# Patient Record
Sex: MALE | Race: WHITE | Employment: FULL TIME | URBAN - METROPOLITAN AREA
[De-identification: names, ages, dates, MRNs, and addresses within clinical notes are randomized per-mention and may not be internally consistent; named-entity substitution may affect disease eponyms.]

---

## 2020-01-09 ENCOUNTER — APPOINTMENT (OUTPATIENT)
Dept: GENERAL RADIOLOGY | Age: 70
DRG: 309 | End: 2020-01-09
Attending: EMERGENCY MEDICINE
Payer: MEDICARE

## 2020-01-09 ENCOUNTER — HOSPITAL ENCOUNTER (INPATIENT)
Age: 70
LOS: 1 days | Discharge: HOME | DRG: 309 | End: 2020-01-10
Attending: EMERGENCY MEDICINE | Admitting: INTERNAL MEDICINE
Payer: MEDICARE

## 2020-01-09 DIAGNOSIS — I48.91 ATRIAL FIBRILLATION, UNSPECIFIED TYPE (HCC): ICD-10-CM

## 2020-01-09 DIAGNOSIS — I48.91 ATRIAL FIBRILLATION WITH RAPID VENTRICULAR RESPONSE (HCC): Primary | ICD-10-CM

## 2020-01-09 DIAGNOSIS — R93.89 ABNORMAL CHEST X-RAY: ICD-10-CM

## 2020-01-09 LAB
ALBUMIN SERPL-MCNC: 4.1 G/DL (ref 3.5–5)
ALBUMIN/GLOB SERPL: 1.2 {RATIO} (ref 1.1–2.2)
ALP SERPL-CCNC: 53 U/L (ref 45–117)
ALT SERPL-CCNC: 73 U/L (ref 12–78)
ANION GAP SERPL CALC-SCNC: 5 MMOL/L (ref 5–15)
AST SERPL-CCNC: 37 U/L (ref 15–37)
ATRIAL RATE: 308 BPM
BASOPHILS # BLD: 0.1 K/UL (ref 0–0.1)
BASOPHILS NFR BLD: 1 % (ref 0–1)
BILIRUB SERPL-MCNC: 0.7 MG/DL (ref 0.2–1)
BUN SERPL-MCNC: 13 MG/DL (ref 6–20)
BUN/CREAT SERPL: 14 (ref 12–20)
CALCIUM SERPL-MCNC: 8.8 MG/DL (ref 8.5–10.1)
CALCULATED R AXIS, ECG10: -10 DEGREES
CALCULATED T AXIS, ECG11: 85 DEGREES
CHLORIDE SERPL-SCNC: 109 MMOL/L (ref 97–108)
CK SERPL-CCNC: 190 U/L (ref 39–308)
CO2 SERPL-SCNC: 27 MMOL/L (ref 21–32)
COMMENT, HOLDF: NORMAL
CREAT SERPL-MCNC: 0.93 MG/DL (ref 0.7–1.3)
DIAGNOSIS, 93000: NORMAL
DIFFERENTIAL METHOD BLD: NORMAL
EOSINOPHIL # BLD: 0.2 K/UL (ref 0–0.4)
EOSINOPHIL NFR BLD: 3 % (ref 0–7)
ERYTHROCYTE [DISTWIDTH] IN BLOOD BY AUTOMATED COUNT: 13.2 % (ref 11.5–14.5)
GLOBULIN SER CALC-MCNC: 3.3 G/DL (ref 2–4)
GLUCOSE SERPL-MCNC: 103 MG/DL (ref 65–100)
HCT VFR BLD AUTO: 44.8 % (ref 36.6–50.3)
HGB BLD-MCNC: 15.1 G/DL (ref 12.1–17)
IMM GRANULOCYTES # BLD AUTO: 0 K/UL (ref 0–0.04)
IMM GRANULOCYTES NFR BLD AUTO: 0 % (ref 0–0.5)
LYMPHOCYTES # BLD: 1.3 K/UL (ref 0.8–3.5)
LYMPHOCYTES NFR BLD: 19 % (ref 12–49)
MCH RBC QN AUTO: 30.3 PG (ref 26–34)
MCHC RBC AUTO-ENTMCNC: 33.7 G/DL (ref 30–36.5)
MCV RBC AUTO: 90 FL (ref 80–99)
MONOCYTES # BLD: 0.6 K/UL (ref 0–1)
MONOCYTES NFR BLD: 8 % (ref 5–13)
NEUTS SEG # BLD: 4.7 K/UL (ref 1.8–8)
NEUTS SEG NFR BLD: 69 % (ref 32–75)
NRBC # BLD: 0 K/UL (ref 0–0.01)
NRBC BLD-RTO: 0 PER 100 WBC
PLATELET # BLD AUTO: 212 K/UL (ref 150–400)
PMV BLD AUTO: 11.1 FL (ref 8.9–12.9)
POTASSIUM SERPL-SCNC: 3.9 MMOL/L (ref 3.5–5.1)
PROT SERPL-MCNC: 7.4 G/DL (ref 6.4–8.2)
Q-T INTERVAL, ECG07: 342 MS
QRS DURATION, ECG06: 120 MS
QTC CALCULATION (BEZET), ECG08: 547 MS
RBC # BLD AUTO: 4.98 M/UL (ref 4.1–5.7)
SAMPLES BEING HELD,HOLD: NORMAL
SODIUM SERPL-SCNC: 141 MMOL/L (ref 136–145)
TROPONIN I BLD-MCNC: <0.04 NG/ML (ref 0–0.08)
TROPONIN I SERPL-MCNC: <0.05 NG/ML
TSH SERPL DL<=0.05 MIU/L-ACNC: 2.36 UIU/ML (ref 0.36–3.74)
VENTRICULAR RATE, ECG03: 154 BPM
WBC # BLD AUTO: 6.9 K/UL (ref 4.1–11.1)

## 2020-01-09 PROCEDURE — 82550 ASSAY OF CK (CPK): CPT

## 2020-01-09 PROCEDURE — 71045 X-RAY EXAM CHEST 1 VIEW: CPT

## 2020-01-09 PROCEDURE — 96376 TX/PRO/DX INJ SAME DRUG ADON: CPT

## 2020-01-09 PROCEDURE — 74011250636 HC RX REV CODE- 250/636: Performed by: EMERGENCY MEDICINE

## 2020-01-09 PROCEDURE — 85025 COMPLETE CBC W/AUTO DIFF WBC: CPT

## 2020-01-09 PROCEDURE — 74011000250 HC RX REV CODE- 250: Performed by: INTERNAL MEDICINE

## 2020-01-09 PROCEDURE — 36415 COLL VENOUS BLD VENIPUNCTURE: CPT

## 2020-01-09 PROCEDURE — 84443 ASSAY THYROID STIM HORMONE: CPT

## 2020-01-09 PROCEDURE — 65660000000 HC RM CCU STEPDOWN

## 2020-01-09 PROCEDURE — 96374 THER/PROPH/DIAG INJ IV PUSH: CPT

## 2020-01-09 PROCEDURE — 93005 ELECTROCARDIOGRAM TRACING: CPT

## 2020-01-09 PROCEDURE — 99285 EMERGENCY DEPT VISIT HI MDM: CPT

## 2020-01-09 PROCEDURE — 84484 ASSAY OF TROPONIN QUANT: CPT

## 2020-01-09 PROCEDURE — 74011250636 HC RX REV CODE- 250/636: Performed by: INTERNAL MEDICINE

## 2020-01-09 PROCEDURE — 74011250637 HC RX REV CODE- 250/637: Performed by: INTERNAL MEDICINE

## 2020-01-09 PROCEDURE — 71046 X-RAY EXAM CHEST 2 VIEWS: CPT

## 2020-01-09 PROCEDURE — 80053 COMPREHEN METABOLIC PANEL: CPT

## 2020-01-09 PROCEDURE — 74011000250 HC RX REV CODE- 250: Performed by: EMERGENCY MEDICINE

## 2020-01-09 RX ORDER — LATANOPROST 50 UG/ML
1 SOLUTION/ DROPS OPHTHALMIC
COMMUNITY

## 2020-01-09 RX ORDER — ACETAMINOPHEN 325 MG/1
650 TABLET ORAL
Status: DISCONTINUED | OUTPATIENT
Start: 2020-01-09 | End: 2020-01-10 | Stop reason: HOSPADM

## 2020-01-09 RX ORDER — DILTIAZEM HYDROCHLORIDE 5 MG/ML
5 INJECTION INTRAVENOUS
Status: COMPLETED | OUTPATIENT
Start: 2020-01-09 | End: 2020-01-09

## 2020-01-09 RX ORDER — LATANOPROST 50 UG/ML
1 SOLUTION/ DROPS OPHTHALMIC
Status: DISCONTINUED | OUTPATIENT
Start: 2020-01-09 | End: 2020-01-10 | Stop reason: HOSPADM

## 2020-01-09 RX ORDER — SERTRALINE HYDROCHLORIDE 50 MG/1
50 TABLET, FILM COATED ORAL DAILY
Status: DISCONTINUED | OUTPATIENT
Start: 2020-01-10 | End: 2020-01-10 | Stop reason: HOSPADM

## 2020-01-09 RX ORDER — HEPARIN SODIUM 5000 [USP'U]/ML
5000 INJECTION, SOLUTION INTRAVENOUS; SUBCUTANEOUS EVERY 8 HOURS
Status: DISCONTINUED | OUTPATIENT
Start: 2020-01-09 | End: 2020-01-09

## 2020-01-09 RX ORDER — SODIUM CHLORIDE 0.9 % (FLUSH) 0.9 %
5-40 SYRINGE (ML) INJECTION AS NEEDED
Status: DISCONTINUED | OUTPATIENT
Start: 2020-01-09 | End: 2020-01-10 | Stop reason: HOSPADM

## 2020-01-09 RX ORDER — DILTIAZEM HYDROCHLORIDE 5 MG/ML
10 INJECTION INTRAVENOUS
Status: DISCONTINUED | OUTPATIENT
Start: 2020-01-09 | End: 2020-01-09

## 2020-01-09 RX ORDER — DILTIAZEM HYDROCHLORIDE 5 MG/ML
10 INJECTION INTRAVENOUS
Status: COMPLETED | OUTPATIENT
Start: 2020-01-09 | End: 2020-01-09

## 2020-01-09 RX ORDER — SODIUM CHLORIDE 0.9 % (FLUSH) 0.9 %
5-40 SYRINGE (ML) INJECTION EVERY 8 HOURS
Status: DISCONTINUED | OUTPATIENT
Start: 2020-01-09 | End: 2020-01-10 | Stop reason: HOSPADM

## 2020-01-09 RX ORDER — METOPROLOL TARTRATE 5 MG/5ML
2.5 INJECTION INTRAVENOUS
Status: DISCONTINUED | OUTPATIENT
Start: 2020-01-09 | End: 2020-01-10 | Stop reason: HOSPADM

## 2020-01-09 RX ORDER — ONDANSETRON 2 MG/ML
4 INJECTION INTRAMUSCULAR; INTRAVENOUS
Status: DISCONTINUED | OUTPATIENT
Start: 2020-01-09 | End: 2020-01-10 | Stop reason: HOSPADM

## 2020-01-09 RX ORDER — DOCUSATE SODIUM 100 MG/1
100 CAPSULE, LIQUID FILLED ORAL
Status: DISCONTINUED | OUTPATIENT
Start: 2020-01-09 | End: 2020-01-10 | Stop reason: HOSPADM

## 2020-01-09 RX ORDER — PANTOPRAZOLE SODIUM 40 MG/1
40 TABLET, DELAYED RELEASE ORAL DAILY
Status: DISCONTINUED | OUTPATIENT
Start: 2020-01-10 | End: 2020-01-10 | Stop reason: HOSPADM

## 2020-01-09 RX ADMIN — Medication 5 ML: at 16:26

## 2020-01-09 RX ADMIN — LATANOPROST 1 DROP: 50 SOLUTION OPHTHALMIC at 23:06

## 2020-01-09 RX ADMIN — Medication 10 ML: at 21:37

## 2020-01-09 RX ADMIN — ACETAMINOPHEN 650 MG: 325 TABLET ORAL at 21:37

## 2020-01-09 RX ADMIN — DILTIAZEM HYDROCHLORIDE 5 MG: 5 INJECTION INTRAVENOUS at 12:34

## 2020-01-09 RX ADMIN — SODIUM CHLORIDE 500 ML: 900 INJECTION, SOLUTION INTRAVENOUS at 13:28

## 2020-01-09 RX ADMIN — APIXABAN 5 MG: 5 TABLET, FILM COATED ORAL at 17:33

## 2020-01-09 RX ADMIN — DEXTROSE MONOHYDRATE 15 MG/HR: 50 INJECTION, SOLUTION INTRAVENOUS at 21:23

## 2020-01-09 RX ADMIN — DILTIAZEM HYDROCHLORIDE 10 MG: 5 INJECTION INTRAVENOUS at 13:30

## 2020-01-09 RX ADMIN — DEXTROSE MONOHYDRATE 5 MG/HR: 50 INJECTION, SOLUTION INTRAVENOUS at 16:07

## 2020-01-09 NOTE — PROGRESS NOTES
Pharmacy Clarification of the Prior to Admission Medication Regimen Retrospective to the Admission Medication Reconciliation    The patient was interviewed regarding clarification of the prior to admission medication regimen. The patient's wife was present in room and obtained permission from patient to discuss drug regimen with visitor(s) present. The patient was questioned regarding use of any other inhalers, topical products, over the counter medications, herbal medications, vitamin products or ophthalmic/nasal/otic medication use. Information Obtained From: Patient, Rx Query    Recommendations/Findings: The following amendments were made to the patient's active medication list on file at [facility]:     1) Additions:   latanoprost (XALATAN) 0.005 % ophthalmic solution      2) Removals:   Percocet      3) Changes:  sertraline (ZOLOFT) 50 mg tablet (Old regimen: Take PO daily /New regimen: Take 50 mg PO daily)    4) Pertinent Pharmacy Findings:  Updated patients preferred outpatient pharmacy to: [address] medication list was corrected to the following:     Prior to Admission Medications   Prescriptions Last Dose Informant Taking?   fexofenadine (ALLEGRA) 60 mg tablet 1/9/2020 at Unknown time Self Yes   Sig: Take 60 mg by mouth daily. Indications: ALLERGIC RHINITIS   latanoprost (XALATAN) 0.005 % ophthalmic solution 1/8/2020 at Unknown time Self Yes   Sig: Administer 1 Drop to right eye nightly. pantoprazole (PROTONIX) 40 mg tablet 1/8/2020 at Unknown time Self Yes   Sig: Take 40 mg by mouth daily. sertraline (ZOLOFT) 50 mg tablet 1/9/2020 at Unknown time Self Yes   Sig: Take 50 mg by mouth daily. Facility-Administered Medications: None          Thank you,  Radha Peacock  Pharm D.  Candidate Class of 2021

## 2020-01-09 NOTE — ED NOTES
Maxed out of Diltazem drip.  Per cardiologist keep drip at 15mg/hr if stable and they will see pt in AM

## 2020-01-09 NOTE — ED PROVIDER NOTES
EMERGENCY DEPARTMENT HISTORY AND PHYSICAL EXAM      Date: 1/9/2020  Patient Name: Marciano Cranker    History of Presenting Illness     Chief Complaint   Patient presents with    Irregular Heart Beat     sent over by Cameron jennings for abnormal ekg       History Provided By: Patient    HPI: Marciano Cranker, 78 y.o. male presents to the ED with cc of irregular heartbeat. The patient states that he was at a routine clinic visit today to obtain a refill of 1 of his medications. He states that they checked his pulse multiple times and his doctor ordered an EKG. He was sent in because of atrial fib/flutter. He cannot tell that his heart is beating rapidly. He states that occasionally he feels short of breath and he had some fleeting chest discomfort when he arrived in the ED today. He denies fever, chills, leg pain or leg edema. He has no known history of prior irregular heartbeat. He states that he had a stress test many years ago, but cannot remember which cardiology group performed it. He denies dizziness or lightheadedness. There are no other complaints, changes, or physical findings at this time. PCP: Mel Phelps MD    No current facility-administered medications on file prior to encounter. Current Outpatient Medications on File Prior to Encounter   Medication Sig Dispense Refill    sertraline (ZOLOFT) 50 mg tablet Take  by mouth daily.  pantoprazole (PROTONIX) 40 mg tablet Take 40 mg by mouth daily.  oxyCODONE-acetaminophen (PERCOCET) 5-325 mg per tablet Take 2 Tabs by mouth every four (4) hours as needed for Pain. Max Daily Amount: 12 Tabs. 20 Tab 0    fexofenadine (ALLEGRA) 60 mg tablet Take 60 mg by mouth daily.  Indications: ALLERGIC RHINITIS         Past History     Past Medical History:  Past Medical History:   Diagnosis Date    GERD (gastroesophageal reflux disease)     Psychiatric disorder     anxiety and panic attacks       Past Surgical History:  Past Surgical History:   Procedure Laterality Date    HX HEENT      septum repair       Family History:  No family history on file. Social History:  Social History     Tobacco Use    Smoking status: Never Smoker   Substance Use Topics    Alcohol use: No    Drug use: No       Allergies: Allergies   Allergen Reactions    Pcn [Penicillins] Rash         Review of Systems   Review of Systems   Constitutional: Negative for chills and fever. HENT: Negative for congestion. Eyes: Negative. Respiratory: Positive for shortness of breath. Cardiovascular: Positive for chest pain. Gastrointestinal: Negative for abdominal pain. Endocrine: Negative for heat intolerance. Genitourinary: Negative for dysuria. Musculoskeletal: Negative for back pain. Skin: Negative for rash. Allergic/Immunologic: Negative for immunocompromised state. Neurological: Negative for dizziness and light-headedness. Hematological: Does not bruise/bleed easily. Psychiatric/Behavioral: Negative. All other systems reviewed and are negative. Physical Exam   Physical Exam  Vitals signs and nursing note reviewed. Constitutional:       General: He is not in acute distress. Appearance: He is well-developed. HENT:      Head: Normocephalic and atraumatic. Eyes:      Extraocular Movements: Extraocular movements intact. Pupils: Pupils are equal, round, and reactive to light. Neck:      Musculoskeletal: Normal range of motion. Cardiovascular:      Rate and Rhythm: Tachycardia present. Rhythm irregular. Pulses: Normal pulses. Heart sounds: Normal heart sounds. Pulmonary:      Effort: Pulmonary effort is normal.      Breath sounds: Normal breath sounds. Abdominal:      General: Bowel sounds are normal.      Palpations: Abdomen is soft. Musculoskeletal: Normal range of motion. General: No swelling. Skin:     General: Skin is warm and dry. Neurological:      General: No focal deficit present. Mental Status: He is alert and oriented to person, place, and time. Coordination: Coordination normal.   Psychiatric:         Mood and Affect: Mood normal.         Behavior: Behavior normal.         Diagnostic Study Results     Labs -     Recent Results (from the past 12 hour(s))   EKG, 12 LEAD, INITIAL    Collection Time: 01/09/20 11:59 AM   Result Value Ref Range    Ventricular Rate 154 BPM    Atrial Rate 308 BPM    QRS Duration 120 ms    Q-T Interval 342 ms    QTC Calculation (Bezet) 547 ms    Calculated R Axis -10 degrees    Calculated T Axis 85 degrees    Diagnosis       Atrial flutter with 2:1 AV conduction  Septal infarct , age undetermined  No previous ECGs available     CBC WITH AUTOMATED DIFF    Collection Time: 01/09/20 12:16 PM   Result Value Ref Range    WBC 6.9 4.1 - 11.1 K/uL    RBC 4.98 4.10 - 5.70 M/uL    HGB 15.1 12.1 - 17.0 g/dL    HCT 44.8 36.6 - 50.3 %    MCV 90.0 80.0 - 99.0 FL    MCH 30.3 26.0 - 34.0 PG    MCHC 33.7 30.0 - 36.5 g/dL    RDW 13.2 11.5 - 14.5 %    PLATELET 873 488 - 285 K/uL    MPV 11.1 8.9 - 12.9 FL    NRBC 0.0 0  WBC    ABSOLUTE NRBC 0.00 0.00 - 0.01 K/uL    NEUTROPHILS 69 32 - 75 %    LYMPHOCYTES 19 12 - 49 %    MONOCYTES 8 5 - 13 %    EOSINOPHILS 3 0 - 7 %    BASOPHILS 1 0 - 1 %    IMMATURE GRANULOCYTES 0 0.0 - 0.5 %    ABS. NEUTROPHILS 4.7 1.8 - 8.0 K/UL    ABS. LYMPHOCYTES 1.3 0.8 - 3.5 K/UL    ABS. MONOCYTES 0.6 0.0 - 1.0 K/UL    ABS. EOSINOPHILS 0.2 0.0 - 0.4 K/UL    ABS. BASOPHILS 0.1 0.0 - 0.1 K/UL    ABS. IMM.  GRANS. 0.0 0.00 - 0.04 K/UL    DF AUTOMATED     METABOLIC PANEL, COMPREHENSIVE    Collection Time: 01/09/20 12:16 PM   Result Value Ref Range    Sodium 141 136 - 145 mmol/L    Potassium 3.9 3.5 - 5.1 mmol/L    Chloride 109 (H) 97 - 108 mmol/L    CO2 27 21 - 32 mmol/L    Anion gap 5 5 - 15 mmol/L    Glucose 103 (H) 65 - 100 mg/dL    BUN 13 6 - 20 MG/DL    Creatinine 0.93 0.70 - 1.30 MG/DL    BUN/Creatinine ratio 14 12 - 20      GFR est AA >60 >60 ml/min/1.73m2    GFR est non-AA >60 >60 ml/min/1.73m2    Calcium 8.8 8.5 - 10.1 MG/DL    Bilirubin, total 0.7 0.2 - 1.0 MG/DL    ALT (SGPT) 73 12 - 78 U/L    AST (SGOT) 37 15 - 37 U/L    Alk. phosphatase 53 45 - 117 U/L    Protein, total 7.4 6.4 - 8.2 g/dL    Albumin 4.1 3.5 - 5.0 g/dL    Globulin 3.3 2.0 - 4.0 g/dL    A-G Ratio 1.2 1.1 - 2.2     CK W/ REFLX CKMB    Collection Time: 01/09/20 12:16 PM   Result Value Ref Range     39 - 308 U/L   SAMPLES BEING HELD    Collection Time: 01/09/20 12:16 PM   Result Value Ref Range    SAMPLES BEING HELD  1 RED      COMMENT        Add-on orders for these samples will be processed based on acceptable specimen integrity and analyte stability, which may vary by analyte. TROPONIN I    Collection Time: 01/09/20 12:16 PM   Result Value Ref Range    Troponin-I, Qt. <0.05 <0.05 ng/mL   POC TROPONIN-I    Collection Time: 01/09/20  1:29 PM   Result Value Ref Range    Troponin-I (POC) <0.04 0.00 - 0.08 ng/mL       Radiologic Studies -   XR CHEST PA LAT   Final Result   IMPRESSION:       1. Left lower lobe airspace opacity with elevation left hemidiaphragm likely   representing volume loss. Superimposed airspace disease not excluded. Consider   chest CT for further assessment. 2.  Interstitial opacity in the right lung base may represent a combination of   interstitial lung disease and superimposed infection. 3.  Reticulonodular opacities in the remaining lungs. XR CHEST PORT   Final Result   IMPRESSION:      Left lung base atelectasis versus pneumonia. Right lung base atelectasis versus   nonspecific interstitial lung disease. Elevated left hemidiaphragm. No   pneumothorax. Consider PA and lateral chest views when the patient can better   tolerate. CT Results  (Last 48 hours)    None        CXR Results  (Last 48 hours)               01/09/20 1428  XR CHEST PA LAT Final result    Impression:  IMPRESSION:        1.   Left lower lobe airspace opacity with elevation left hemidiaphragm likely   representing volume loss. Superimposed airspace disease not excluded. Consider   chest CT for further assessment. 2.  Interstitial opacity in the right lung base may represent a combination of   interstitial lung disease and superimposed infection. 3.  Reticulonodular opacities in the remaining lungs. Narrative:  EXAM: XR CHEST PA LAT       INDICATION: Abnormal portable chest x-ray, 2 view recommended by radiologist       COMPARISON: None. FINDINGS: PA and lateral radiographs of the chest demonstrate elevation left   hemidiaphragm. There is left lower lobe airspace opacity. Prominent interstitial   markings in the right lung base are noted which may reflect underlying   interstitial lung disease with superimposed interstitial edema or infection. .   Reticulonodular opacities are noted in the remaining lungs. Heart size is mildly   enlarged. . The bones and soft tissues are within normal limits. 01/09/20 1304  XR CHEST PORT Final result    Impression:  IMPRESSION:       Left lung base atelectasis versus pneumonia. Right lung base atelectasis versus   nonspecific interstitial lung disease. Elevated left hemidiaphragm. No   pneumothorax. Consider PA and lateral chest views when the patient can better   tolerate. Narrative:  EXAM: XR CHEST PORT       INDICATION: Slight chest pain, mild shortness of breath, and hypertension today. Abnormal electrocardiogram.       COMPARISON: None       TECHNIQUE: Upright portable chest AP view       FINDINGS: Cardiac monitoring wires overlie the thorax. Enlarged cardiac   silhouette may be accentuated by technique. Aortic arch is not enlarged. The   pulmonary vasculature is within normal limits. Left lung base opacity is nonspecific. Left diaphragm is elevated. Reticular   interstitial opacities are heterogeneous in the right lung base. No   pneumothorax.  The visualized bones and upper abdomen are age-appropriate. Medical Decision Making   I am the first provider for this patient. I reviewed the vital signs, available nursing notes, past medical history, past surgical history, family history and social history. Vital Signs-Reviewed the patient's vital signs. Patient Vitals for the past 12 hrs:   Temp Pulse Resp BP SpO2   01/09/20 1415  (!) 153 21 (!) 123/93 93 %   01/09/20 1400  (!) 153 19 (!) 128/97 96 %   01/09/20 1345  (!) 154 20 132/90 93 %   01/09/20 1330  (!) 154 22 (!) 129/99 95 %   01/09/20 1315  (!) 154 17 (!) 125/96 94 %   01/09/20 1300  (!) 153 22 (!) 135/102 95 %   01/09/20 1245  (!) 153 18 (!) 124/94 95 %   01/09/20 1230  (!) 153 16 (!) 129/99 96 %   01/09/20 1215  (!) 154 23 (!) 142/104 96 %   01/09/20 1150 97.9 °F (36.6 °C) (!) 156 16 (!) 145/97 97 %       EKG interpretation: (Preliminary)  Rhythm: atrial flutter 2:1; and irregular. Rate (approx.): 154; Axis: normal; WA interval: Unable to calculate; QRS interval: normal ; ST/T wave: non-specific changes; Other findings: abnormal ekg. Records Reviewed: Nursing Notes, Old Medical Records, Previous electrocardiograms, Previous Radiology Studies and Previous Laboratory Studies    Provider Notes (Medical Decision Making): A flutter with RVR, electrolyte abnormality, CAD    ED Course:   Initial assessment performed. The patients presenting problems have been discussed, and they are in agreement with the care plan formulated and outlined with them. I have encouraged them to ask questions as they arise throughout their visit. Consult note:    I have placed a cardiology consult, but they will not be available for at least 90 minutes. Consult note:     The patient is being admitted by the hospitalist    CRITICAL CARE NOTE :    2:21 PM      IMPENDING DETERIORATION -Respiratory, Cardiovascular and Metabolic    ASSOCIATED RISK FACTORS - Hypotension, Hypoxia, Dysrhythmia and Metabolic changes    MANAGEMENT- Bedside Assessment and Supervision of Care    INTERPRETATION -  Xrays, ECG and Blood Pressure    INTERVENTIONS - hemodynamic mngmt and Metobolic interventions    CASE REVIEW - Hospitalist, Medical Sub-Specialist, Nursing and Family    TREATMENT RESPONSE -Unchanged     PERFORMED BY - Self        NOTES   :      I have spent 45 minutes of critical care time involved in lab review, consultations with specialist, family decision- making, bedside attention and documentation. During this entire length of time I was immediately available to the patient . Makayla Ledezma MD                                                       Critical Care Time:   39    Disposition:  admit    PLAN:  1. Current Discharge Medication List        2. Follow-up Information    None       Return to ED if worse     Diagnosis     Clinical Impression:   1. Atrial fibrillation with rapid ventricular response ([address]    2. Atrial fibrillation, unspecified type ([address]    3. Abnormal chest x-ray        Attestations:    Makayla Ledezma MD    Please note that this dictation was completed with [employer], the computer voice recognition software. Quite often unanticipated grammatical, syntax, homophones, and other interpretive errors are inadvertently transcribed by the computer software. Please disregard these errors. Please excuse any errors that have escaped final proofreading. Thank you.

## 2020-01-09 NOTE — CONSULTS
Consult    NAME: Tyler Cisneros   :  1950   MRN:  [id number]     Date/Time:  2020 4:27 PM    Patient PCP: Farooq Ochoa MD  ________________________________________________________________________     Assessment:     1. Atrial flutter  2. GERD  3. Anxiety  4. , ret'd from [address]:   HR 150s    1. NPO p MN  2. BROOKE/DCCV in the AM  3. Start eliquis 5mg BID  4. Continue diltiazem gtt; increase as needed  5. If maxed on dilt can use metoprolol  6. Check a TSH  7. Will need to follow w/ EP as outpt to discuss an ablation    Thank you for this consult and allowing me to take part in this patients care. Please call with questions. [x]        High complexity decision making was performed        Subjective:   CHIEF COMPLAINT: Palps    HISTORY OF PRESENT ILLNESS:     Luna Avila is a 78 y.o.  male who \"presents to the ED with cc of irregular heartbeat. The patient states that he was at a routine clinic visit today to obtain a refill of 1 of his medications. He states that they checked his pulse multiple times and his doctor ordered an EKG. He was sent in because of atrial fib/flutter. He cannot tell that his heart is beating rapidly. He states that occasionally he feels short of breath and he had some fleeting chest discomfort when he arrived in the ED today. Has fever, chills, leg pain or leg edema. He has no known history of prior irregular heartbeat. He states that he had a stress test many years ago, but cannot remember which cardiology group performed it. He denies dizziness or lightheadedness. \"      We were asked to consult for work up and evaluation of the above problems.      Past Medical History:   Diagnosis Date    GERD (gastroesophageal reflux disease)     Psychiatric disorder     anxiety and panic attacks      Past Surgical History:   Procedure Laterality Date    HX HEENT      septum repair     Allergies   Allergen Reactions    Pcn [Penicillins] Rash      Meds:  See below  Social History     Tobacco Use    Smoking status: Never Smoker   Substance Use Topics    Alcohol use: No      No family history on file. REVIEW OF SYSTEMS:     []         Unable to obtain  ROS due to ---   [x]         Total of 12 systems reviewed as follows:    Constitutional: negative fever, negative chills, negative weight loss  Eyes:   negative visual changes  ENT:   negative sore throat, tongue or lip swelling  Respiratory:  negative cough, negative dyspnea  Cards:  negative for chest pain, palpitations, lower extremity edema  GI:   negative for nausea, vomiting, diarrhea, and abdominal pain  Genitourinary: negative for frequency, dysuria  Integument:  negative for rash   Hematologic:  negative for easy bruising and gum/nose bleeding  Musculoskel: negative for myalgias,  back pain  Neurological:  negative for headaches, dizziness, vertigo, weakness  Behavl/Psych: negative for feelings of anxiety, depression     Pertinent Positives include :    Objective:      Physical Exam:    Last 24hrs VS reviewed since prior progress note. Most recent are:    Visit Vitals  BP (!) 123/93   Pulse (!) 153   Temp 97.9 °F (36.6 °C)   Resp 21   Ht 5' 9\" (1.753 m)   Wt 107.9 kg (237 lb 14 oz)   SpO2 93%   BMI 35.13 kg/m²     No intake or output data in the 24 hours ending 01/09/20 1627     General Appearance: Well developed, well nourished, alert & oriented x 3,    no acute distress. Ears/Nose/Mouth/Throat: Pupils equal and round, Hearing grossly normal.  Neck: Supple. JVP within normal limits. Carotids good upstrokes, with no bruit. Chest: Lungs clear to auscultation bilaterally. Cardiovascular: Irregular rate and rhythm, S1S2 normal, no murmur, rubs, gallops. Abdomen: Soft, non-tender, bowel sounds are active. No organomegaly. Extremities: No edema bilaterally. Femoral pulses +2, Distal Pulses +1. Skin: Warm and dry.   Neuro: CN II-XII grossly intact, Strength and sensation grossly intact. []         Post-cath site without hematoma, bruit, tenderness, or thrill. Distal pulses intact. Data:      Telemetry:  AFL    EKG:  AFL  []  No new EKG for review. Prior to Admission medications    Medication Sig Start Date End Date Taking? Authorizing Provider   latanoprost (XALATAN) 0.005 % ophthalmic solution Administer 1 Drop to right eye nightly. Yes Provider, Historical   sertraline (ZOLOFT) 50 mg tablet Take 50 mg by mouth daily. Yes Other, MD Dwain   pantoprazole (PROTONIX) 40 mg tablet Take 40 mg by mouth daily. Yes Other, MD Dwain   fexofenadine (ALLEGRA) 60 mg tablet Take 60 mg by mouth daily. Indications: ALLERGIC RHINITIS   Yes Provider, Historical       Recent Results (from the past 24 hour(s))   EKG, 12 LEAD, INITIAL    Collection Time: 01/09/20 11:59 AM   Result Value Ref Range    Ventricular Rate 154 BPM    Atrial Rate 308 BPM    QRS Duration 120 ms    Q-T Interval 342 ms    QTC Calculation (Bezet) 547 ms    Calculated R Axis -10 degrees    Calculated T Axis 85 degrees    Diagnosis       Atrial flutter with 2:1 AV conduction  Septal infarct , age undetermined  No previous ECGs available     CBC WITH AUTOMATED DIFF    Collection Time: 01/09/20 12:16 PM   Result Value Ref Range    WBC 6.9 4.1 - 11.1 K/uL    RBC 4.98 4.10 - 5.70 M/uL    HGB 15.1 12.1 - 17.0 g/dL    HCT 44.8 36.6 - 50.3 %    MCV 90.0 80.0 - 99.0 FL    MCH 30.3 26.0 - 34.0 PG    MCHC 33.7 30.0 - 36.5 g/dL    RDW 13.2 11.5 - 14.5 %    PLATELET 294 445 - 933 K/uL    MPV 11.1 8.9 - 12.9 FL    NRBC 0.0 0  WBC    ABSOLUTE NRBC 0.00 0.00 - 0.01 K/uL    NEUTROPHILS 69 32 - 75 %    LYMPHOCYTES 19 12 - 49 %    MONOCYTES 8 5 - 13 %    EOSINOPHILS 3 0 - 7 %    BASOPHILS 1 0 - 1 %    IMMATURE GRANULOCYTES 0 0.0 - 0.5 %    ABS. NEUTROPHILS 4.7 1.8 - 8.0 K/UL    ABS. LYMPHOCYTES 1.3 0.8 - 3.5 K/UL    ABS. MONOCYTES 0.6 0.0 - 1.0 K/UL    ABS. EOSINOPHILS 0.2 0.0 - 0.4 K/UL    ABS.  BASOPHILS 0.1 0.0 - 0.1 K/UL ABS. IMM. GRANS. 0.0 0.00 - 0.04 K/UL    DF AUTOMATED     METABOLIC PANEL, COMPREHENSIVE    Collection Time: 01/09/20 12:16 PM   Result Value Ref Range    Sodium 141 136 - 145 mmol/L    Potassium 3.9 3.5 - 5.1 mmol/L    Chloride 109 (H) 97 - 108 mmol/L    CO2 27 21 - 32 mmol/L    Anion gap 5 5 - 15 mmol/L    Glucose 103 (H) 65 - 100 mg/dL    BUN 13 6 - 20 MG/DL    Creatinine 0.93 0.70 - 1.30 MG/DL    BUN/Creatinine ratio 14 12 - 20      GFR est AA >60 >60 ml/min/1.73m2    GFR est non-AA >60 >60 ml/min/1.73m2    Calcium 8.8 8.5 - 10.1 MG/DL    Bilirubin, total 0.7 0.2 - 1.0 MG/DL    ALT (SGPT) 73 12 - 78 U/L    AST (SGOT) 37 15 - 37 U/L    Alk. phosphatase 53 45 - 117 U/L    Protein, total 7.4 6.4 - 8.2 g/dL    Albumin 4.1 3.5 - 5.0 g/dL    Globulin 3.3 2.0 - 4.0 g/dL    A-G Ratio 1.2 1.1 - 2.2     CK W/ REFLX CKMB    Collection Time: 01/09/20 12:16 PM   Result Value Ref Range     39 - 308 U/L   SAMPLES BEING HELD    Collection Time: 01/09/20 12:16 PM   Result Value Ref Range    SAMPLES BEING HELD  1 RED      COMMENT        Add-on orders for these samples will be processed based on acceptable specimen integrity and analyte stability, which may vary by analyte.    TROPONIN I    Collection Time: 01/09/20 12:16 PM   Result Value Ref Range    Troponin-I, Qt. <0.05 <0.05 ng/mL   POC TROPONIN-I    Collection Time: 01/09/20  1:29 PM   Result Value Ref Range    Troponin-I (POC) <0.04 0.00 - 0.08 ng/mL        Blake Wang III, DO

## 2020-01-09 NOTE — ED NOTES
Pt went to PCP for a med refill and PCP noticed HR was elevated so did EKG and sent pt to ER. Pt denies cardiac history.  States he has slight chest pain and mild SOB but denies any other sx

## 2020-01-10 ENCOUNTER — APPOINTMENT (OUTPATIENT)
Dept: NON INVASIVE DIAGNOSTICS | Age: 70
DRG: 309 | End: 2020-01-10
Attending: INTERNAL MEDICINE
Payer: MEDICARE

## 2020-01-10 ENCOUNTER — APPOINTMENT (OUTPATIENT)
Dept: CT IMAGING | Age: 70
DRG: 309 | End: 2020-01-10
Attending: INTERNAL MEDICINE
Payer: MEDICARE

## 2020-01-10 VITALS
TEMPERATURE: 98.6 F | WEIGHT: 237.88 LBS | RESPIRATION RATE: 20 BRPM | OXYGEN SATURATION: 92 % | BODY MASS INDEX: 35.23 KG/M2 | DIASTOLIC BLOOD PRESSURE: 70 MMHG | SYSTOLIC BLOOD PRESSURE: 108 MMHG | HEIGHT: 69 IN | HEART RATE: 80 BPM

## 2020-01-10 LAB
ANION GAP SERPL CALC-SCNC: 6 MMOL/L (ref 5–15)
ATRIAL RATE: 315 BPM
BASOPHILS # BLD: 0.1 K/UL (ref 0–0.1)
BASOPHILS NFR BLD: 2 % (ref 0–1)
BUN SERPL-MCNC: 12 MG/DL (ref 6–20)
BUN/CREAT SERPL: 14 (ref 12–20)
CALCIUM SERPL-MCNC: 8.2 MG/DL (ref 8.5–10.1)
CALCULATED R AXIS, ECG10: -12 DEGREES
CALCULATED T AXIS, ECG11: 119 DEGREES
CHLORIDE SERPL-SCNC: 108 MMOL/L (ref 97–108)
CO2 SERPL-SCNC: 26 MMOL/L (ref 21–32)
CREAT SERPL-MCNC: 0.86 MG/DL (ref 0.7–1.3)
DIAGNOSIS, 93000: NORMAL
DIFFERENTIAL METHOD BLD: ABNORMAL
EOSINOPHIL # BLD: 0.2 K/UL (ref 0–0.4)
EOSINOPHIL NFR BLD: 4 % (ref 0–7)
ERYTHROCYTE [DISTWIDTH] IN BLOOD BY AUTOMATED COUNT: 13.5 % (ref 11.5–14.5)
GLUCOSE SERPL-MCNC: 108 MG/DL (ref 65–100)
HCT VFR BLD AUTO: 42.1 % (ref 36.6–50.3)
HGB BLD-MCNC: 14.2 G/DL (ref 12.1–17)
IMM GRANULOCYTES # BLD AUTO: 0 K/UL (ref 0–0.04)
IMM GRANULOCYTES NFR BLD AUTO: 1 % (ref 0–0.5)
LYMPHOCYTES # BLD: 1.5 K/UL (ref 0.8–3.5)
LYMPHOCYTES NFR BLD: 25 % (ref 12–49)
MCH RBC QN AUTO: 31 PG (ref 26–34)
MCHC RBC AUTO-ENTMCNC: 33.7 G/DL (ref 30–36.5)
MCV RBC AUTO: 91.9 FL (ref 80–99)
MONOCYTES # BLD: 0.5 K/UL (ref 0–1)
MONOCYTES NFR BLD: 9 % (ref 5–13)
NEUTS SEG # BLD: 3.7 K/UL (ref 1.8–8)
NEUTS SEG NFR BLD: 59 % (ref 32–75)
NRBC # BLD: 0 K/UL (ref 0–0.01)
NRBC BLD-RTO: 0 PER 100 WBC
PLATELET # BLD AUTO: 199 K/UL (ref 150–400)
PMV BLD AUTO: 11 FL (ref 8.9–12.9)
POTASSIUM SERPL-SCNC: 3.8 MMOL/L (ref 3.5–5.1)
Q-T INTERVAL, ECG07: 284 MS
QRS DURATION, ECG06: 108 MS
QTC CALCULATION (BEZET), ECG08: 430 MS
RBC # BLD AUTO: 4.58 M/UL (ref 4.1–5.7)
SODIUM SERPL-SCNC: 140 MMOL/L (ref 136–145)
TROPONIN I SERPL-MCNC: <0.05 NG/ML
VENTRICULAR RATE, ECG03: 138 BPM
WBC # BLD AUTO: 6.1 K/UL (ref 4.1–11.1)

## 2020-01-10 PROCEDURE — 5A2204Z RESTORATION OF CARDIAC RHYTHM, SINGLE: ICD-10-PCS | Performed by: INTERNAL MEDICINE

## 2020-01-10 PROCEDURE — 84484 ASSAY OF TROPONIN QUANT: CPT

## 2020-01-10 PROCEDURE — B24BZZ4 ULTRASONOGRAPHY OF HEART WITH AORTA, TRANSESOPHAGEAL: ICD-10-PCS | Performed by: INTERNAL MEDICINE

## 2020-01-10 PROCEDURE — 93312 ECHO TRANSESOPHAGEAL: CPT

## 2020-01-10 PROCEDURE — 99152 MOD SED SAME PHYS/QHP 5/>YRS: CPT

## 2020-01-10 PROCEDURE — 80048 BASIC METABOLIC PNL TOTAL CA: CPT

## 2020-01-10 PROCEDURE — 74011250637 HC RX REV CODE- 250/637: Performed by: INTERNAL MEDICINE

## 2020-01-10 PROCEDURE — 71260 CT THORAX DX C+: CPT

## 2020-01-10 PROCEDURE — 99153 MOD SED SAME PHYS/QHP EA: CPT

## 2020-01-10 PROCEDURE — 85025 COMPLETE CBC W/AUTO DIFF WBC: CPT

## 2020-01-10 PROCEDURE — 74011636320 HC RX REV CODE- 636/320: Performed by: INTERNAL MEDICINE

## 2020-01-10 PROCEDURE — 36415 COLL VENOUS BLD VENIPUNCTURE: CPT

## 2020-01-10 PROCEDURE — 74011000250 HC RX REV CODE- 250: Performed by: INTERNAL MEDICINE

## 2020-01-10 PROCEDURE — 93005 ELECTROCARDIOGRAM TRACING: CPT

## 2020-01-10 PROCEDURE — 74011250636 HC RX REV CODE- 250/636: Performed by: INTERNAL MEDICINE

## 2020-01-10 RX ORDER — FENTANYL CITRATE 50 UG/ML
25-50 INJECTION, SOLUTION INTRAMUSCULAR; INTRAVENOUS
Status: DISCONTINUED | OUTPATIENT
Start: 2020-01-10 | End: 2020-01-10 | Stop reason: HOSPADM

## 2020-01-10 RX ORDER — SODIUM CHLORIDE 0.9 % (FLUSH) 0.9 %
10 SYRINGE (ML) INJECTION
Status: DISCONTINUED | OUTPATIENT
Start: 2020-01-10 | End: 2020-01-10 | Stop reason: HOSPADM

## 2020-01-10 RX ORDER — LIDOCAINE HYDROCHLORIDE 20 MG/ML
15 SOLUTION OROPHARYNGEAL AS NEEDED
Status: DISCONTINUED | OUTPATIENT
Start: 2020-01-10 | End: 2020-01-10 | Stop reason: HOSPADM

## 2020-01-10 RX ORDER — METOPROLOL TARTRATE 25 MG/1
25 TABLET, FILM COATED ORAL EVERY 12 HOURS
Status: DISCONTINUED | OUTPATIENT
Start: 2020-01-10 | End: 2020-01-10 | Stop reason: HOSPADM

## 2020-01-10 RX ORDER — MIDAZOLAM HYDROCHLORIDE 1 MG/ML
.5-2 INJECTION, SOLUTION INTRAMUSCULAR; INTRAVENOUS
Status: DISCONTINUED | OUTPATIENT
Start: 2020-01-10 | End: 2020-01-10 | Stop reason: HOSPADM

## 2020-01-10 RX ORDER — METOPROLOL TARTRATE 25 MG/1
25 TABLET, FILM COATED ORAL EVERY 12 HOURS
Qty: 60 TAB | Refills: 0 | Status: SHIPPED | OUTPATIENT
Start: 2020-01-10 | End: 2020-02-09

## 2020-01-10 RX ADMIN — MIDAZOLAM 2 MG: 1 INJECTION INTRAMUSCULAR; INTRAVENOUS at 09:30

## 2020-01-10 RX ADMIN — IOPAMIDOL 80 ML: 755 INJECTION, SOLUTION INTRAVENOUS at 07:28

## 2020-01-10 RX ADMIN — FENTANYL CITRATE 25 MCG: 50 INJECTION, SOLUTION INTRAMUSCULAR; INTRAVENOUS at 09:30

## 2020-01-10 RX ADMIN — Medication 10 ML: at 05:58

## 2020-01-10 RX ADMIN — MIDAZOLAM 2 MG: 1 INJECTION INTRAMUSCULAR; INTRAVENOUS at 09:25

## 2020-01-10 RX ADMIN — MIDAZOLAM 2 MG: 1 INJECTION INTRAMUSCULAR; INTRAVENOUS at 09:39

## 2020-01-10 RX ADMIN — FENTANYL CITRATE 50 MCG: 50 INJECTION, SOLUTION INTRAMUSCULAR; INTRAVENOUS at 09:25

## 2020-01-10 RX ADMIN — BENZOCAINE, BUTAMBEN, AND TETRACAINE HYDROCHLORIDE 3 SPRAY: .028; .004; .004 AEROSOL, SPRAY TOPICAL at 09:25

## 2020-01-10 RX ADMIN — FENTANYL CITRATE 50 MCG: 50 INJECTION, SOLUTION INTRAMUSCULAR; INTRAVENOUS at 09:44

## 2020-01-10 RX ADMIN — SERTRALINE HYDROCHLORIDE 50 MG: 50 TABLET ORAL at 09:00

## 2020-01-10 RX ADMIN — DEXTROSE MONOHYDRATE 7.5 MG/HR: 50 INJECTION, SOLUTION INTRAVENOUS at 09:01

## 2020-01-10 RX ADMIN — MIDAZOLAM 1 MG: 1 INJECTION INTRAMUSCULAR; INTRAVENOUS at 09:44

## 2020-01-10 RX ADMIN — PANTOPRAZOLE SODIUM 40 MG: 40 TABLET, DELAYED RELEASE ORAL at 09:00

## 2020-01-10 RX ADMIN — MIDAZOLAM 1 MG: 1 INJECTION INTRAMUSCULAR; INTRAVENOUS at 09:35

## 2020-01-10 RX ADMIN — LIDOCAINE HYDROCHLORIDE 15 ML: 20 SOLUTION ORAL; TOPICAL at 09:25

## 2020-01-10 RX ADMIN — FENTANYL CITRATE 25 MCG: 50 INJECTION, SOLUTION INTRAMUSCULAR; INTRAVENOUS at 09:36

## 2020-01-10 RX ADMIN — METOPROLOL TARTRATE 25 MG: 25 TABLET ORAL at 11:51

## 2020-01-10 RX ADMIN — APIXABAN 5 MG: 5 TABLET, FILM COATED ORAL at 09:00

## 2020-01-10 NOTE — PROGRESS NOTES
[address] with family today. Pt had BROOKE and will be discharged after appt. No Second IM needed because Pt not hear longer than 24 hours. Wife can transport Pt home in car. CM emailed CM Specialist to make PCP appt. 2:13 PM   Eliquis 30 day free card was provided to Pt prior to DC.     1:15 PM   Reason for Admission:   Pt was admitted on 1/9/2020 d/t a Dx of of AFIB/GERD/Anxiety/    Pt had BROOKE Cardioversion today. DC order written                   RRAT Score:          8           Plan for utilizing home health:      Not Ordered/ pt not homebound                    Current Advanced Directive/Advance Care Plan: Full code. No AMD on file and Pt discharging and was not interested in doing AMD today. No further CM needs.      Horace Acevedo, [facility]

## 2020-01-10 NOTE — PROGRESS NOTES
Brief Procedure Note    Patient: Tyler Cisneros MRN: [id number]  SSN: [id number]    YOB: 1950  Age: 78 y.o. Sex: male      Date of Procedure: 1/10/2020     Pre-procedure Diagnosis: AFL    Post-procedure Diagnosis: Nml LVEF, no sig valve disease, no BASHIR clot, DCCV to SR    Procedure: BROOKE/DCCV    Performed By: Estrella Liu III, DO     Anesthesia: Moderate Sedation    Estimated Blood Loss: Less than 10 mL      Specimens:  None    Findings: as above    Complications: None    Implants: None    Recommendations: Continue medical therapy.     Signed By: Lynda Martínez DO     January 10, 2020

## 2020-01-10 NOTE — H&P
Hospitalist Admission Note    NAME: Celestina Montesinos   :  1950   MRN:  [id number]     Date/Time:  2020 9:58 PM    Patient PCP: Viv Boston MD  ________________________________________________________________________    My assessment of this patient's clinical condition and my plan of care is as follows. Assessment / Plan:  New onset atrial flutter with rapid ventricular rate  -Patient is asymptomatic  -Continue Cardizem drip that was started in the ED. Add PRN metoprolol if still tachycardic  -Check TSH level  -Cardiology recommended to start Eliquis  -Keep n.p.o. from midnight for BROOKE and cardioversion in a.m.  -Check troponins x2  -Potassium is 3.9. Check magnesium level in a.m. Abnormal chest x-ray  Questionable pneumonia versus reticulonodular densities  -Chest x-ray is abnormal with above findings  -We will get CT chest for further delineation    Depression  History of GERD  -Continue Zoloft and Protonix            Code Status: Full code  Surrogate Decision Maker: Wife    DVT Prophylaxis: Eliquis  GI Prophylaxis: not indicated    Baseline: From home independent        Subjective:   CHIEF COMPLAINT: Sent from PCP clinic due to abnormal heart rate    HISTORY OF PRESENT ILLNESS:     Celestina Montesinos is a 78 y.o.   male who presents with past medical history of gastroesophageal reflux disease, anxiety is coming to the hospital after he was sent by his primary care physician due to abnormal heart rate. Patient went for routine refill of his medications and was noted to have irregular pulse for which he underwent a EKG which revealed atrial flutter flutter and was not advised to go to the emergency department for further evaluation. Does not report any chest pain, shortness of breath, palpitations, nausea or vomiting. Denies abdominal pain, diarrhea or constipation. Denies dysuria urgency.   Denies similar episodes in the past.    On arrival to the hospital, he had an EKG in the emergency department which showed evidence of atrial flutter with rapid ventricular rate. Cardiology was contacted and they recommended Cardizem drip. Family history  No coronary artery disease in the family    We were asked to admit for work up and evaluation of the above problems. Past Medical History:   Diagnosis Date    GERD (gastroesophageal reflux disease)     Psychiatric disorder     anxiety and panic attacks        Past Surgical History:   Procedure Laterality Date    HX HEENT      septum repair       Social History     Tobacco Use    Smoking status: Never Smoker   Substance Use Topics    Alcohol use: No      Family history  No coronary artery disease in the family    Allergies   Allergen Reactions    Pcn [Penicillins] Rash        Prior to Admission medications    Medication Sig Start Date End Date Taking? Authorizing Provider   latanoprost (XALATAN) 0.005 % ophthalmic solution Administer 1 Drop to right eye nightly. Yes Provider, Historical   sertraline (ZOLOFT) 50 mg tablet Take 50 mg by mouth daily. Yes Other, MD Dwain   pantoprazole (PROTONIX) 40 mg tablet Take 40 mg by mouth daily. Yes Other, MD Dwain   fexofenadine (ALLEGRA) 60 mg tablet Take 60 mg by mouth daily. Indications: ALLERGIC RHINITIS   Yes Provider, Historical       REVIEW OF SYSTEMS:     I am not able to complete the review of systems because:    The patient is intubated and sedated    The patient has altered mental status due to his acute medical problems    The patient has baseline aphasia from prior stroke(s)    The patient has baseline dementia and is not reliable historian    The patient is in acute medical distress and unable to provide information           Total of 12 systems reviewed as follows:       POSITIVE= underlined text  Negative = text not underlined  General:  fever, chills, sweats, generalized weakness, weight loss/gain,      loss of appetite   Eyes:    blurred vision, eye pain, loss of vision, double vision  ENT:    rhinorrhea, pharyngitis   Respiratory:   cough, sputum production, SOB, DOMINIQUE, wheezing, pleuritic pain   Cardiology:   chest pain, palpitations, orthopnea, PND, edema, syncope   Gastrointestinal:  abdominal pain , N/V, diarrhea, dysphagia, constipation, bleeding   Genitourinary:  frequency, urgency, dysuria, hematuria, incontinence   Muskuloskeletal :  arthralgia, myalgia, back pain  Hematology:  easy bruising, nose or gum bleeding, lymphadenopathy   Dermatological: rash, ulceration, pruritis, color change / jaundice  Endocrine:   hot flashes or polydipsia   Neurological:  headache, dizziness, confusion, focal weakness, paresthesia,     Speech difficulties, memory loss, gait difficulty  Psychological: Feelings of anxiety, depression, agitation    Objective:   VITALS:    Visit Vitals  /85   Pulse (!) 153   Temp 97.9 °F (36.6 °C)   Resp 21   Ht 5' 9\" (1.753 m)   Wt 107.9 kg (237 lb 14 oz)   SpO2 93%   BMI 35.13 kg/m²       PHYSICAL EXAM:    General:    Alert, cooperative, no distress, appears stated age. HEENT: Atraumatic, anicteric sclerae, pink conjunctivae     No oral ulcers, mucosa moist, throat clear, dentition fair  Neck:  Supple, symmetrical,  thyroid: non tender  Lungs:   Clear to auscultation bilaterally. No Wheezing or Rhonchi. No rales. Chest wall:  No tenderness  No Accessory muscle use. Heart:   irregular  rhythm,  No  murmur   No edema  Abdomen:   Soft, non-tender. Not distended. Bowel sounds normal  Extremities: No cyanosis. No clubbing,      Skin turgor normal, Capillary refill normal, Radial dial pulse 2+  Skin:     Not pale. Not Jaundiced  No rashes   Psych:  Good insight. Not depressed. Not anxious or agitated. Neurologic: EOMs intact. No facial asymmetry. No aphasia or slurred speech. Symmetrical strength, Sensation grossly intact.  Alert and oriented X 4.     _______________________________________________________________________  Care Plan discussed with: Comments   Patient y    Family      RN y    Care Manager                    Consultant:      _______________________________________________________________________  Expected  Disposition:   Home with Family y   HH/PT/OT/RN    SNF/LTC    ALINA    ________________________________________________________________________  TOTAL TIME:  61 Minutes    Critical Care Provided     Minutes non procedure based      Comments    y Reviewed previous records   >50% of visit spent in counseling and coordination of care y Discussion with patient and/or family and questions answered       ________________________________________________________________________  Signed: Lawyer Flako MD    Procedures: see electronic medical records for all procedures/Xrays and details which were not copied into this note but were reviewed prior to creation of Plan. LAB DATA REVIEWED:    Recent Results (from the past 24 hour(s))   EKG, 12 LEAD, INITIAL    Collection Time: 01/09/20 11:59 AM   Result Value Ref Range    Ventricular Rate 154 BPM    Atrial Rate 308 BPM    QRS Duration 120 ms    Q-T Interval 342 ms    QTC Calculation (Bezet) 547 ms    Calculated R Axis -10 degrees    Calculated T Axis 85 degrees    Diagnosis       Atrial flutter with 2:1 AV conduction  Septal infarct , age undetermined  No previous ECGs available     CBC WITH AUTOMATED DIFF    Collection Time: 01/09/20 12:16 PM   Result Value Ref Range    WBC 6.9 4.1 - 11.1 K/uL    RBC 4.98 4.10 - 5.70 M/uL    HGB 15.1 12.1 - 17.0 g/dL    HCT 44.8 36.6 - 50.3 %    MCV 90.0 80.0 - 99.0 FL    MCH 30.3 26.0 - 34.0 PG    MCHC 33.7 30.0 - 36.5 g/dL    RDW 13.2 11.5 - 14.5 %    PLATELET 637 812 - 621 K/uL    MPV 11.1 8.9 - 12.9 FL    NRBC 0.0 0  WBC    ABSOLUTE NRBC 0.00 0.00 - 0.01 K/uL    NEUTROPHILS 69 32 - 75 %    LYMPHOCYTES 19 12 - 49 %    MONOCYTES 8 5 - 13 %    EOSINOPHILS 3 0 - 7 %    BASOPHILS 1 0 - 1 %    IMMATURE GRANULOCYTES 0 0.0 - 0.5 %    ABS.  NEUTROPHILS 4.7 1.8 - 8.0 K/UL    ABS. LYMPHOCYTES 1.3 0.8 - 3.5 K/UL    ABS. MONOCYTES 0.6 0.0 - 1.0 K/UL    ABS. EOSINOPHILS 0.2 0.0 - 0.4 K/UL    ABS. BASOPHILS 0.1 0.0 - 0.1 K/UL    ABS. IMM. GRANS. 0.0 0.00 - 0.04 K/UL    DF AUTOMATED     METABOLIC PANEL, COMPREHENSIVE    Collection Time: 01/09/20 12:16 PM   Result Value Ref Range    Sodium 141 136 - 145 mmol/L    Potassium 3.9 3.5 - 5.1 mmol/L    Chloride 109 (H) 97 - 108 mmol/L    CO2 27 21 - 32 mmol/L    Anion gap 5 5 - 15 mmol/L    Glucose 103 (H) 65 - 100 mg/dL    BUN 13 6 - 20 MG/DL    Creatinine 0.93 0.70 - 1.30 MG/DL    BUN/Creatinine ratio 14 12 - 20      GFR est AA >60 >60 ml/min/1.73m2    GFR est non-AA >60 >60 ml/min/1.73m2    Calcium 8.8 8.5 - 10.1 MG/DL    Bilirubin, total 0.7 0.2 - 1.0 MG/DL    ALT (SGPT) 73 12 - 78 U/L    AST (SGOT) 37 15 - 37 U/L    Alk. phosphatase 53 45 - 117 U/L    Protein, total 7.4 6.4 - 8.2 g/dL    Albumin 4.1 3.5 - 5.0 g/dL    Globulin 3.3 2.0 - 4.0 g/dL    A-G Ratio 1.2 1.1 - 2.2     CK W/ REFLX CKMB    Collection Time: 01/09/20 12:16 PM   Result Value Ref Range     39 - 308 U/L   SAMPLES BEING HELD    Collection Time: 01/09/20 12:16 PM   Result Value Ref Range    SAMPLES BEING HELD  1 RED      COMMENT        Add-on orders for these samples will be processed based on acceptable specimen integrity and analyte stability, which may vary by analyte.    TROPONIN I    Collection Time: 01/09/20 12:16 PM   Result Value Ref Range    Troponin-I, Qt. <0.05 <0.05 ng/mL   TSH 3RD GENERATION    Collection Time: 01/09/20 12:16 PM   Result Value Ref Range    TSH 2.36 0.36 - 3.74 uIU/mL   POC TROPONIN-I    Collection Time: 01/09/20  1:29 PM   Result Value Ref Range    Troponin-I (POC) <0.04 0.00 - 0.08 ng/mL   EKG, 12 LEAD, INITIAL    Collection Time: 01/09/20  8:07 PM   Result Value Ref Range    Ventricular Rate 138 BPM    Atrial Rate 315 BPM    QRS Duration 108 ms    Q-T Interval 284 ms    QTC Calculation (Bezet) 430 ms    Calculated R Axis -12 degrees Calculated T Axis 119 degrees    Diagnosis       Atrial flutter with variable AV block  Nonspecific ST and T wave abnormality  When compared with ECG of 09-JAN-2020 11:59,  MANUAL COMPARISON REQUIRED, DATA IS UNCONFIRMED

## 2020-01-10 NOTE — PROGRESS NOTES
Patient arrived to [facility] recovery and report received from Farhad Schwartz; Patient drowsy but arousable; VSS; Obtaining EKG per MD order;  Will transfer back to patient room once patient is more awake

## 2020-01-10 NOTE — DISCHARGE INSTRUCTIONS
AFTER YOUR TRANSESOPHAGEAL ECHOCARDIOGRAM    Be sure someone else drives you home; do not drive today. You may feel drowsy for several hours. Do not eat or drink for at least two hours after your procedure. Your throat will be numb and there is a risk you might have difficulty swallowing for a while. Be careful when you do eat or drink for the first time especially with hot fluids since you could easily burn your throat. Call your doctor if:    · You are bleeding from your throat or mouth. · You have trouble breathing all of a sudden. · You have chest pain or any pain that spreads to your neck, jaw, or arms. · You have questions or concerns. · You have a fever greater than 101°F.    Doctor: Diamond Sheehan    Special Instructions:    No driving for 24 hours. Discharge Medications:   {Medication reconciliation information is now added to the patient's AVS automatically when it is printed. There is no need to use this SmartLink in discharge instructions. Highlight this text and delete it to clear this message}       Patient Discharge Instructions    Gela Murguia / [id number] : 1950    Admitted 2020 Discharged: 1/10/2020         DISCHARGE DIAGNOSIS:   New onset atrial flutter with rapid ventricular rate   Abnormal chest x-ray likely pulmonary edema from A fib resolved      Take Home Medications     {Medication reconciliation information is now added to the patient's AVS automatically when it is printed. There is no need to use this SmartLink in discharge instructions. Highlight this text and delete it to clear this message}      General drug facts     If you have a very bad allergy, wear an allergy ID at all times. It is important that you take the medication exactly as they are prescribed. Keep your medication in the bottles provided by the pharmacist.  Keep a list of all your drugs (prescription, natural products, vitamins, OTC) with you. Give this list to your doctor.   Do not take other medications without consulting your doctor. Do not share your drugs with others and do not take anyone else's drugs. Keep all drugs out of the reach of children and pets. Most drugs may be thrown away in household trash after mixing with coffee grounds or bakari litter and sealing in a plastic bag. Keep a list Call your doctor for help with any side effects. If in the [place] you may also call the FDA at 9-100-JRW-6877    Talk with the doctor before starting any new drug, including OTC, natural products, or vitamins. What to do at Home    1. Recommended diet: cardiac    2. Recommended activity: Activity as tolerated    3. If you experience any of the following symptoms then please call your primary care physician or return to the emergency room if you cannot get hold of your doctor:    4. Wound Care: none    5. Lab work: Cbc and Bmp in 1 week     6. Bring these papers with you to your follow up appointments.  The papers will help your doctors be sure to continue the care plan from the hospital.  7.  If you notice any bleeding, stop taking blood thinner and go to nearest emergency department      Follow-up with:   PCP: Mariia Ramirez MD  Follow-up Information     Follow up With Specialties Details Why Contact Info    Melinda Palomo MD Cardiology Schedule an appointment as soon as possible for a visit in 3 weeks  [address] [facility]  [address]  [address] [facility]      Mariia Ramirez MD Internal Medicine Schedule an appointment as soon as possible for a visit in 1 week  [place] [id number]  [address] [phone]  [address] Cardiology Schedule an appointment as soon as possible for a visit in 2 weeks  [address] [facility]  [address] [facility]  [address] [phone]             Please call for your own appointment        Information obtained by :  I understand that if any problems occur once I am at home I am to contact my physician. I understand and acknowledge receipt of the instructions indicated above.                                                                                                                                            Physician's or R.N.'s Signature                                                                  Date/Time                                                                                                                                              Patient or Representative Signature                                                          Date/Time

## 2020-01-10 NOTE — DISCHARGE SUMMARY
Hospitalist Discharge Summary     Patient ID:  Kusum Ibanez  [id number]  78 y.o.  1950 1/9/2020    PCP on record: Tam Gottlieb MD    Admit date: 1/9/2020  Discharge date and time: 1/10/2020    DISCHARGE DIAGNOSIS:    New onset atrial flutter with rapid ventricular rate s/p BROOKE and DCCV  Abnormal chest x-ray with mild pulmonary edema resolved  Depression  History of GERD      CONSULTATIONS:  IP CONSULT TO CARDIOLOGY  IP CONSULT TO HOSPITALIST    Excerpted HPI from H&P of Lawyer Flako MD:  Kusum Ibanez is a 78 y.o.   male who presents with past medical history of gastroesophageal reflux disease, anxiety is coming to the hospital after he was sent by his primary care physician due to abnormal heart rate. Patient went for routine refill of his medications and was noted to have irregular pulse for which he underwent a EKG which revealed atrial flutter flutter and was not advised to go to the emergency department for further evaluation. Does not report any chest pain, shortness of breath, palpitations, nausea or vomiting. Denies abdominal pain, diarrhea or constipation. Denies dysuria urgency. Denies similar episodes in the past.     On arrival to the hospital, he had an EKG in the emergency department which showed evidence of atrial flutter with rapid ventricular rate. Cardiology was contacted and they recommended Cardizem drip. Family history  No coronary artery disease in the family       ______________________________________________________________________  DISCHARGE SUMMARY/HOSPITAL COURSE:  for full details see H&P, daily progress notes, labs, consult notes. Patient was admitted to hospital and diagnosed to have atrial flutter with rapid ventricular rate. Cardiology consultation was placed. Patient was started on Cardizem drip. Patient underwent a BROOKE along with direct cardiac version and was in sinus rhythm.   Cardiology recommended to start Eliquis and metoprolol and follow-up with them in outpatient basis. Postprocedure patient did well with no complications and was cleared by cardiology to be discharged for outpatient follow-up. Patient also had abnormal chest x-ray with possible edema for which he underwent a CT chest which most likely showed pulmonary edema due to cardiac arrhythmia. I personally discussed with the pulmonologist on call Dr. Nicky Aponte who felt that patient most likely has mild pulmonary edema due to atrial flutter and did not recommend any further work-up. His rest of the medical problems remained stable during hospitalization. Today patient is seen and examined, his vital signs are stable, his lab work is at baseline and he was cleared by all consultant parties to be discharged for outpatient follow-up.    _______________________________________________________________________  Patient seen and examined by me on discharge day. Pertinent Findings:  Gen:    Not in distress  Chest: Clear lungs  CVS:   Regular rhythm. No edema  Abd:  Soft, not distended, not tender  Neuro:  Alert, awake  _______________________________________________________________________  DISCHARGE MEDICATIONS:   Discharge Medication List as of 1/10/2020  2:14 PM      START taking these medications    Details   apixaban (ELIQUIS) 5 mg tablet Take 1 Tab by mouth every twelve (12) hours for 30 days. , Normal, Disp-60 Tab, R-0      metoprolol tartrate (LOPRESSOR) 25 mg tablet Take 1 Tab by mouth every twelve (12) hours for 30 days. , Normal, Disp-60 Tab, R-0         CONTINUE these medications which have NOT CHANGED    Details   latanoprost (XALATAN) 0.005 % ophthalmic solution Administer 1 Drop to right eye nightly., Historical Med      sertraline (ZOLOFT) 50 mg tablet Take 50 mg by mouth daily. , Historical Med      pantoprazole (PROTONIX) 40 mg tablet Take 40 mg by mouth daily. , Historical Med      fexofenadine (ALLEGRA) 60 mg tablet Take 60 mg by mouth daily. Indications: ALLERGIC RHINITIS, Historical Med               Patient Follow Up Instructions:     1. Recommended diet: cardiac    2. Recommended activity: Activity as tolerated    3. If you experience any of the following symptoms then please call your primary care physician or return to the emergency room if you cannot get hold of your doctor:    4. Wound Care: none    5. Lab work: Cbc and Bmp in 1 week      Follow-up Information     Follow up With Specialties Details Why Contact Info    Anish Serna MD Cardiology Schedule an appointment as soon as possible for a visit in 3 weeks CM called to make appt and office will call you directly to schedule the follow up appt. [address] [address]  [address]  [facility]      Kirti Bacon MD Internal Medicine Go on 1/17/2020 Hospital follow-up scheduled at 11:30am ( If you have questions or need to reschedule please call Toñito Velasquez  [address] [phone] [facility], [address] [place] [address], DO Cardiology Schedule an appointment as soon as possible for a visit in 2 weeks for Cardiology follow up appt.   CM called office and the office will call you to schedule follow up  [address]  [address]  [place] [facility] [address].  [phone]          ________________________________________________________________    Risk of deterioration: High    Condition at Discharge:  Stable  __________________________________________________________________    Disposition  Home with family, no needs    ____________________________________________________________________    Code Status: Full Code  ___________________________________________________________________      Total time in minutes spent coordinating this discharge (includes going over instructions, follow-up, prescriptions, and preparing report for sign off to her PCP) :  35  minutes    Signed:  Robert Martini MD

## 2020-01-10 NOTE — PROGRESS NOTES
Progress Note      1/10/2020 9:03 AM  NAME: Nakul Kearney   MRN:  [id number]   Admit Diagnosis: Atrial fibrillation with rapid ventricular response (HCC) [I48.91]      Problem List:     1. Atrial flutter  2. GERD  3. Anxiety  4. , ret'd from [employer]     Assessment/Plan:     1. NPO  2. BROOKE/DCCV  3. Continue eliquis 5mg BID  4. Continue diltiazem gtt for now  5. Pending HR post-DCCV will add metop  6. OK w/ home after procedure  7. Will need to follow w/ EP as outpt to discuss an ablation         [x]       High complexity decision making was performed in this patient at high risk for decompensation with multiple organ involvement. Subjective:     Nakul Kearney denies chest pain, dyspnea. Discussed with RN events overnight. Review of Systems:    Symptom Y/N Comments  Symptom Y/N Comments   Fever/Chills N   Chest Pain N    Poor Appetite N   Edema N    Cough N   Abdominal Pain N    Sputum N   Joint Pain N    SOB/DOMINIQUE N   Pruritis/Rash N    Nausea/vomit N   Tolerating PT/OT Y    Diarrhea N   Tolerating Diet Y    Constipation N   Other       Could NOT obtain due to:      Objective:      Physical Exam:    Last 24hrs VS reviewed since prior progress note. Most recent are:    Visit Vitals  /71   Pulse (!) 149   Temp 98 °F (36.7 °C)   Resp 18   Ht 5' 9\" (1.753 m)   Wt 107.9 kg (237 lb 14 oz)   SpO2 93%   BMI 35.13 kg/m²       Intake/Output Summary (Last 24 hours) at 1/10/2020 3265  Last data filed at 1/10/2020 0300  Gross per 24 hour   Intake 0 ml   Output    Net 0 ml        General Appearance: Well developed, well nourished, alert & oriented x 3,    no acute distress. Ears/Nose/Mouth/Throat: Hearing grossly normal.  Neck: Supple. Chest: Lungs clear to auscultation bilaterally. Cardiovascular: Irregular rate and rhythm, S1S2 normal, no murmur. Abdomen: Soft, non-tender, bowel sounds are active. Extremities: No edema bilaterally. Skin: Warm and dry.     []         Post-cath site without hematoma, bruit, tenderness, or thrill. Distal pulses intact. PMH/SH reviewed - no change compared to H&P    Data Review    Telemetry: AFL    EKG:   [x]  No new EKG for review    Lab Data Personally Reviewed:    Recent Labs     01/10/20  0427 01/09/20  1216   WBC 6.1 6.9   HGB 14.2 15.1   HCT 42.1 44.8    212     No results for input(s): INR, PTP, APTT, INREXT in the last 72 hours. Recent Labs     01/10/20  0427 01/09/20  1216    141   K 3.8 3.9    109*   CO2 26 27   BUN 12 13   CREA 0.86 0.93   * 103*   CA 8.2* 8.8     Recent Labs     01/10/20  0427 01/09/20  1216   TROIQ <0.05 <0.05     No results found for: CHOL, CHOLX, CHLST, CHOLV, HDL, HDLP, LDL, LDLC, DLDLP, TGLX, TRIGL, TRIGP, CHHD, CHHDX    Recent Labs     01/09/20  1216   SGOT 37   AP 53   TP 7.4   ALB 4.1   GLOB 3.3     No results for input(s): PH, PCO2, PO2 in the last 72 hours.     Medications Personally Reviewed:    Current Facility-Administered Medications   Medication Dose Route Frequency    sodium chloride (NS) flush 10 mL  10 mL IntraVENous RAD ONCE    dilTIAZem (CARDIZEM) 100 mg in dextrose 5% (MBP/ADV) 100 mL infusion  0-15 mg/hr IntraVENous TITRATE    latanoprost (XALATAN) 0.005 % ophthalmic solution 1 Drop  1 Drop Right Eye QHS    pantoprazole (PROTONIX) tablet 40 mg  40 mg Oral DAILY    sertraline (ZOLOFT) tablet 50 mg  50 mg Oral DAILY    sodium chloride (NS) flush 5-40 mL  5-40 mL IntraVENous Q8H    sodium chloride (NS) flush 5-40 mL  5-40 mL IntraVENous PRN    acetaminophen (TYLENOL) tablet 650 mg  650 mg Oral Q6H PRN    ondansetron (ZOFRAN) injection 4 mg  4 mg IntraVENous Q6H PRN    docusate sodium (COLACE) capsule 100 mg  100 mg Oral DAILY PRN    apixaban (ELIQUIS) tablet 5 mg  5 mg Oral Q12H    metoprolol (LOPRESSOR) injection 2.5 mg  2.5 mg IntraVENous Q6H PRN         Pat Brasher III, DO

## 2020-01-10 NOTE — PROGRESS NOTES
TRANSFER - OUT REPORT:    Verbal report given to Melvi Justin RN(name) on [employer] being transferred to [facility](unit) for routine post - op       Report consisted of patient's Situation, Background, Assessment and   Recommendations(SBAR). Information from the following report(s) SBAR was reviewed with the receiving nurse. Opportunity for questions and clarification was provided.       Patient transported with:   O2 @ 2 liters

## 2020-01-10 NOTE — ACP (ADVANCE CARE PLANNING)
Advance Care Planning Note      NAME: Marciano Cranker   :  1950   MRN:  [id number]     Date/Time:  2020 10:04 PM    Active Diagnoses:  Hospital Problems  Date Reviewed: 2014          Codes Class Noted POA    Atrial fibrillation with rapid ventricular response [facility]) ICD-10-CM: I48.91  ICD-9-CM: 427.31  2020 Unknown          Reticulo nodular densities on lungs  Depression  GERD    These active diagnoses are of sufficient risk that focused discussion on advance care planning is indicated in order to allow the patient to thoughtfully consider personal goals of care, and if situations arise that prevent the ability to personally give input, to ensure appropriate representation of their personal desires for different levels and aggressiveness of care. Discussion:   Code status addressed and wants to be a Full code. Patient wants central line and vasopressors if needed. Patient  would like to assign wife Lisbeth Tye  as the surrogate decision maker. Persons present and participating in discussion: Keven Rand MD,  Wife Lisbeth Tye. Time Spent:   Total time spent face-to-face in education and discussion:   Wife Maureen  minutes.          Esequiel Narvaez MD   Hospitalist

## 2020-01-10 NOTE — PROGRESS NOTES
PCP JAIDA appt scheduled with  on 1/17/2020 at 11:30am Appt added to AVS. MIRELLA Lawrence CM Specialist

## 2020-01-10 NOTE — PROGRESS NOTES
TRANSFER - IN REPORT:    Verbal report received from Kaykay Brady RN(name) on [employer]  being received from ED(unit) for routine progression of care      Report consisted of patients Situation, Background, Assessment and   Recommendations(SBAR). Information from the following report(s) SBAR, Kardex, Intake/Output, MAR, Recent Results and Med Rec Status was reviewed with the receiving nurse. Opportunity for questions and clarification was provided. Assessment completed upon patients arrival to unit and care assumed. 2137. Patient had a headache, Tylenol PRN given. 9847. Patient had an uneventful night. No question or concerns at this time. Patient progressing towards goals. Bedside and Verbal shift change report given to Tash YOUSSEF (oncoming nurse) by Esequiel Meadows (offgoing nurse). Report included the following information SBAR, Kardex, Intake/Output, MAR, Recent Results and Med Rec Status.

## 2020-01-10 NOTE — ED NOTES
Spoke with cardiologist about rythmn change (red) and MD states to keep pt on drip. No medications changes needed at this time.  Bp remains stable

## 2020-01-10 NOTE — PROGRESS NOTES
0710-Bedside shift change report given to Kasey Aj (oncoming nurse) by Eron Mclain (offgoing nurse). Report included the following information SBAR, Kardex, Intake/Output and MAR.   2384- Patient down to CT with RN  0800- Patient back from CT  0813 Cardizem increased to 7.5 's 140's. Patient asymptomatic.  0905- AM medications given. Cath lab into take patient for procedure. 1018-Patient back to room. Vitals stable. Patient awake and alert. NSR on monitor. Wife at bedside. Questions answered. Call bell in patient reach. 1530- Discharge instructions given and reviewed with patient and patient's wife. PIV and telemetry removed. Patient aware prescriptions ready for  at pharmacy. Patient discharged to front entrance with all personal belongings  Via wheelchair by PCT where wife awaiting to pick patient up.

## 2020-01-10 NOTE — PHYSICIAN ADVISORY
Short Stay Review       Pt Name:  Janet Tate   MR#  [id number]   [facility]#   [id number]   [address] [place] [address]  2261/01  @ [facility]   Hospitalization date  1/9/2020 11:53 AM  No discharge date for patient encounter. Current Attending Physician  Jono Costa MD     A discharge order has been placed for this episode of hospital care for Mr. Janet Tate; since this hospital stay is less than two midnights, I reviewed Mr. Ila Montanez's chart. Mr. Ila Montanez's healthcare insurance/benefit include:  Payor: [place] MED[place]ARE / Plan: [place] MEDICARE PART A & B / Product Type: Medicare /     Utilization Review related case summary:   Age  78 y.o.   BMI  Body mass index is 35.13 kg/m². PMHx includes  GERD, Metsanurga 48 course  The pt presented with Afib RVR who was kept NPO and later put through [facility] with improvement of his symptoms and signs    Risk of deterioration at the time this patient  was hospitalized  High               On the basis of chart review, this patient's hospitalization status      is appropriate for INPATIENT          The information in this document is a recommendation to be used for utilization review and utilization management purposes only. This recommendation is not an order. The recommendation is made based on the information reviewed at the time of the referral, is pursuant to the [employer] [facility] [place] Conditions of Participation (42 CFR Part 482), and is neither a judgment nor an assessment with regard to the appropriateness or quality of clinical care. For all Managed Care patients: The Criteria are intended solely for use as screening guidelines with respect to the medical appropriateness of healthcare services and not for final clinical or payment determinations concerning the type or level of medical care provided, or proposed to be provided, to a patient.  They help the reviewers determine whether a patient is appropriate for observation or inpatient admission at the time a decision to admit the patient is being made. All efforts are made to apply the pertinent payor criteria ([employer] or [employer]) as well as the clinical judgements based on the information reviewed at the time of the referral.\" Nothing in this document may be used to limit, alter, or affect clinical services provided to the patient named below.       Jose Luis Boss MD MPH FACP   Cell : [phone]  Physician [address] [place] [address]   Utilization Review, Care Management         CSN:  [id number]   GIALNUCA:   [id number]  Admitted on :  1/9/2020   Discharge order

## 2020-01-11 LAB
ATRIAL RATE: 81 BPM
CALCULATED P AXIS, ECG09: 52 DEGREES
CALCULATED R AXIS, ECG10: -19 DEGREES
CALCULATED T AXIS, ECG11: 39 DEGREES
DIAGNOSIS, 93000: NORMAL
P-R INTERVAL, ECG05: 142 MS
Q-T INTERVAL, ECG07: 388 MS
QRS DURATION, ECG06: 108 MS
QTC CALCULATION (BEZET), ECG08: 450 MS
VENTRICULAR RATE, ECG03: 81 BPM